# Patient Record
Sex: FEMALE | Race: WHITE | Employment: OTHER | URBAN - METROPOLITAN AREA
[De-identification: names, ages, dates, MRNs, and addresses within clinical notes are randomized per-mention and may not be internally consistent; named-entity substitution may affect disease eponyms.]

---

## 2019-10-14 ENCOUNTER — TELEPHONE (OUTPATIENT)
Dept: RHEUMATOLOGY | Facility: CLINIC | Age: 64
End: 2019-10-14

## 2019-10-14 ENCOUNTER — OFFICE VISIT (OUTPATIENT)
Dept: RHEUMATOLOGY | Facility: CLINIC | Age: 64
End: 2019-10-14
Payer: COMMERCIAL

## 2019-10-14 ENCOUNTER — LAB ENCOUNTER (OUTPATIENT)
Dept: LAB | Age: 64
End: 2019-10-14
Attending: INTERNAL MEDICINE
Payer: COMMERCIAL

## 2019-10-14 VITALS
WEIGHT: 251 LBS | SYSTOLIC BLOOD PRESSURE: 132 MMHG | HEART RATE: 104 BPM | RESPIRATION RATE: 18 BRPM | BODY MASS INDEX: 46.19 KG/M2 | DIASTOLIC BLOOD PRESSURE: 84 MMHG | HEIGHT: 62 IN | OXYGEN SATURATION: 97 %

## 2019-10-14 DIAGNOSIS — L97.919 ULCERS OF BOTH LOWER LEGS (HCC): Primary | ICD-10-CM

## 2019-10-14 DIAGNOSIS — L97.929 ULCERS OF BOTH LOWER LEGS (HCC): Primary | ICD-10-CM

## 2019-10-14 DIAGNOSIS — L97.919 ULCERS OF BOTH LOWER LEGS (HCC): ICD-10-CM

## 2019-10-14 DIAGNOSIS — L97.929 ULCERS OF BOTH LOWER LEGS (HCC): ICD-10-CM

## 2019-10-14 PROCEDURE — 85613 RUSSELL VIPER VENOM DILUTED: CPT

## 2019-10-14 PROCEDURE — 84165 PROTEIN E-PHORESIS SERUM: CPT

## 2019-10-14 PROCEDURE — 86146 BETA-2 GLYCOPROTEIN ANTIBODY: CPT | Performed by: INTERNAL MEDICINE

## 2019-10-14 PROCEDURE — 86160 COMPLEMENT ANTIGEN: CPT | Performed by: INTERNAL MEDICINE

## 2019-10-14 PROCEDURE — 85025 COMPLETE CBC W/AUTO DIFF WBC: CPT

## 2019-10-14 PROCEDURE — 86235 NUCLEAR ANTIGEN ANTIBODY: CPT

## 2019-10-14 PROCEDURE — 83876 ASSAY MYELOPEROXIDASE: CPT

## 2019-10-14 PROCEDURE — 82595 ASSAY OF CRYOGLOBULIN: CPT

## 2019-10-14 PROCEDURE — 85730 THROMBOPLASTIN TIME PARTIAL: CPT

## 2019-10-14 PROCEDURE — 80053 COMPREHEN METABOLIC PANEL: CPT

## 2019-10-14 PROCEDURE — 83516 IMMUNOASSAY NONANTIBODY: CPT

## 2019-10-14 PROCEDURE — 86038 ANTINUCLEAR ANTIBODIES: CPT

## 2019-10-14 PROCEDURE — 83516 IMMUNOASSAY NONANTIBODY: CPT | Performed by: INTERNAL MEDICINE

## 2019-10-14 PROCEDURE — 86255 FLUORESCENT ANTIBODY SCREEN: CPT

## 2019-10-14 PROCEDURE — 86225 DNA ANTIBODY NATIVE: CPT

## 2019-10-14 PROCEDURE — 86140 C-REACTIVE PROTEIN: CPT | Performed by: INTERNAL MEDICINE

## 2019-10-14 PROCEDURE — 85610 PROTHROMBIN TIME: CPT

## 2019-10-14 PROCEDURE — 86147 CARDIOLIPIN ANTIBODY EA IG: CPT

## 2019-10-14 PROCEDURE — 85652 RBC SED RATE AUTOMATED: CPT | Performed by: INTERNAL MEDICINE

## 2019-10-14 PROCEDURE — 86200 CCP ANTIBODY: CPT | Performed by: INTERNAL MEDICINE

## 2019-10-14 PROCEDURE — 85390 FIBRINOLYSINS SCREEN I&R: CPT

## 2019-10-14 PROCEDURE — 86431 RHEUMATOID FACTOR QUANT: CPT | Performed by: INTERNAL MEDICINE

## 2019-10-14 PROCEDURE — 36415 COLL VENOUS BLD VENIPUNCTURE: CPT

## 2019-10-14 PROCEDURE — 85598 HEXAGNAL PHOSPH PLTLT NEUTRL: CPT

## 2019-10-14 PROCEDURE — 99244 OFF/OP CNSLTJ NEW/EST MOD 40: CPT | Performed by: INTERNAL MEDICINE

## 2019-10-14 RX ORDER — LIDOCAINE HYDROCHLORIDE 20 MG/ML
SOLUTION ORAL; TOPICAL
Refills: 3 | COMMUNITY
Start: 2019-10-02

## 2019-10-14 RX ORDER — NEBIVOLOL HYDROCHLORIDE 10 MG/1
TABLET ORAL
Refills: 1 | COMMUNITY
Start: 2019-10-02

## 2019-10-14 RX ORDER — HYDRALAZINE HYDROCHLORIDE 10 MG/1
TABLET, FILM COATED ORAL
Refills: 1 | COMMUNITY
Start: 2019-10-02

## 2019-10-14 RX ORDER — APIXABAN 5 MG/1
TABLET, FILM COATED ORAL
Refills: 0 | COMMUNITY
Start: 2019-10-02

## 2019-10-14 RX ORDER — HYDROCODONE BITARTRATE AND ACETAMINOPHEN 10; 325 MG/1; MG/1
TABLET ORAL
Refills: 0 | COMMUNITY
Start: 2019-10-03

## 2019-10-14 RX ORDER — GABAPENTIN 100 MG/1
CAPSULE ORAL
Refills: 0 | COMMUNITY
Start: 2019-09-25

## 2019-10-14 NOTE — PROGRESS NOTES
Pancho Brenner is a 59year old female who presents for Patient presents with:  Consult  Wound: Bilateral Legs  Leg Pain: bilateral  .   HPI:   CC: wound LE  Consult: referred by PCP Dr. Ramez Perry (wound care doctor)    This is a 60 yo F with hx of HTN, B PO QD, Disp: , Rfl: 1       Past Medical History:   Diagnosis Date   • Essential hypertension       Past Surgical History:   Procedure Laterality Date   • BREAST LUMPECTOMY Bilateral       Family History   Problem Relation Age of Onset   • Other (Lupus) Ma fists  Wrist: FROM, no pain or swelling or warmth on palpation  Elbow: FROM, no pain or swelling or warmth on palpation  Shoulders: R shoulder abduction 100 degress  Hip: normal log roll, no lateral hip pain, DOMENICO test negative b/l  Knees: R knee flexion

## 2019-10-14 NOTE — TELEPHONE ENCOUNTER
Per patient she was just in the office a little bit ago and would like to ask Dr Soheila Mccloud what's the name medical name, when your finger tips turn white in the cold?

## 2019-10-14 NOTE — PATIENT INSTRUCTIONS
You were seen today lower leg ulcers  Lets see if there is anything autoimmune going on  Will get information from dr. Maria T Olmedo and dr. Jewel Luis  Will let you know if anything autimmune is going on once I get all blood work back

## 2019-10-24 ENCOUNTER — MED REC SCAN ONLY (OUTPATIENT)
Dept: RHEUMATOLOGY | Facility: CLINIC | Age: 64
End: 2019-10-24

## 2020-05-22 ENCOUNTER — TELEPHONE (OUTPATIENT)
Dept: RHEUMATOLOGY | Facility: CLINIC | Age: 65
End: 2020-05-22

## 2020-05-22 NOTE — TELEPHONE ENCOUNTER
Gaurang Delgado from [facility] with Dr Horacio Reza is requesting any office visit notes and labs that were done for patient.     Fax   [phone]